# Patient Record
Sex: FEMALE | Race: WHITE | URBAN - METROPOLITAN AREA
[De-identification: names, ages, dates, MRNs, and addresses within clinical notes are randomized per-mention and may not be internally consistent; named-entity substitution may affect disease eponyms.]

---

## 2024-07-03 ENCOUNTER — APPOINTMENT (RX ONLY)
Dept: URBAN - METROPOLITAN AREA CLINIC 49 | Facility: CLINIC | Age: 39
Setting detail: DERMATOLOGY
End: 2024-07-03

## 2024-07-03 VITALS — WEIGHT: 200 LBS | HEIGHT: 68 IN

## 2024-07-03 DIAGNOSIS — Z71.89 OTHER SPECIFIED COUNSELING: ICD-10-CM

## 2024-07-03 DIAGNOSIS — L81.4 OTHER MELANIN HYPERPIGMENTATION: ICD-10-CM

## 2024-07-03 DIAGNOSIS — D22 MELANOCYTIC NEVI: ICD-10-CM

## 2024-07-03 DIAGNOSIS — D485 NEOPLASM OF UNCERTAIN BEHAVIOR OF SKIN: ICD-10-CM

## 2024-07-03 DIAGNOSIS — L82.1 OTHER SEBORRHEIC KERATOSIS: ICD-10-CM

## 2024-07-03 DIAGNOSIS — D18.0 HEMANGIOMA: ICD-10-CM

## 2024-07-03 PROBLEM — D18.01 HEMANGIOMA OF SKIN AND SUBCUTANEOUS TISSUE: Status: ACTIVE | Noted: 2024-07-03

## 2024-07-03 PROBLEM — D22.5 MELANOCYTIC NEVI OF TRUNK: Status: ACTIVE | Noted: 2024-07-03

## 2024-07-03 PROBLEM — D48.5 NEOPLASM OF UNCERTAIN BEHAVIOR OF SKIN: Status: ACTIVE | Noted: 2024-07-03

## 2024-07-03 PROCEDURE — ? SUNSCREEN RECOMMENDATIONS

## 2024-07-03 PROCEDURE — ? DEFER

## 2024-07-03 PROCEDURE — ? FULL BODY SKIN EXAM

## 2024-07-03 PROCEDURE — ? COUNSELING

## 2024-07-03 PROCEDURE — 99203 OFFICE O/P NEW LOW 30 MIN: CPT

## 2024-07-03 ASSESSMENT — LOCATION DETAILED DESCRIPTION DERM
LOCATION DETAILED: EPIGASTRIC SKIN
LOCATION DETAILED: LEFT INFERIOR UPPER BACK
LOCATION DETAILED: LEFT ANTERIOR DISTAL THIGH
LOCATION DETAILED: LEFT SUPERIOR UPPER BACK

## 2024-07-03 ASSESSMENT — LOCATION SIMPLE DESCRIPTION DERM
LOCATION SIMPLE: ABDOMEN
LOCATION SIMPLE: LEFT THIGH
LOCATION SIMPLE: LEFT UPPER BACK

## 2024-07-03 ASSESSMENT — LOCATION ZONE DERM
LOCATION ZONE: TRUNK
LOCATION ZONE: LEG

## 2024-07-03 NOTE — PROCEDURE: DEFER
X Size Of Lesion In Cm (Optional): 0
Size Of Lesion In Cm (Optional): 0.3
Introduction Text (Please End With A Colon): The following procedure was deferred: discussed shave removal vs punch removal
Detail Level: Detailed
Introduction Text (Please End With A Colon): The following procedure was deferred: discussed shave removal vs punch